# Patient Record
Sex: MALE | URBAN - METROPOLITAN AREA
[De-identification: names, ages, dates, MRNs, and addresses within clinical notes are randomized per-mention and may not be internally consistent; named-entity substitution may affect disease eponyms.]

---

## 2022-10-31 ENCOUNTER — APPOINTMENT (RX ONLY)
Dept: URBAN - METROPOLITAN AREA CLINIC 176 | Facility: CLINIC | Age: 47
Setting detail: DERMATOLOGY
End: 2022-10-31

## 2022-10-31 DIAGNOSIS — Z41.9 ENCOUNTER FOR PROCEDURE FOR PURPOSES OTHER THAN REMEDYING HEALTH STATE, UNSPECIFIED: ICD-10-CM

## 2022-10-31 PROCEDURE — ? HYDRAFACIAL

## 2022-10-31 ASSESSMENT — LOCATION SIMPLE DESCRIPTION DERM
LOCATION SIMPLE: RIGHT FOREHEAD
LOCATION SIMPLE: LEFT CHEEK
LOCATION SIMPLE: RIGHT CHEEK

## 2022-10-31 ASSESSMENT — LOCATION DETAILED DESCRIPTION DERM
LOCATION DETAILED: RIGHT INFERIOR CENTRAL MALAR CHEEK
LOCATION DETAILED: LEFT CENTRAL MALAR CHEEK
LOCATION DETAILED: RIGHT MEDIAL FOREHEAD

## 2022-10-31 ASSESSMENT — LOCATION ZONE DERM: LOCATION ZONE: FACE

## 2022-10-31 NOTE — PROCEDURE: HYDRAFACIAL
Solution Override
Vacuum Pressure High Setting (Will Not Render If Set To 0): 22
Tip: Hydropeel Tip, Teal
Number Of Passes: 0
Price (Use Numbers Only, No Special Characters Or $): 169.00
Indication: anti-aging
Consent: Written consent obtained, risks reviewed including but not limited to crusting, scabbing, blistering, scarring, darker or lighter pigmentary change, bruising, and/or incomplete response.
Tip Override
Procedure: Peel
Procedure: Exfoliation
Vacuum Pressure High Setting (Will Not Render If Set To 0): 16
Post-Care Instructions: I reviewed with the patient in detail post-care instructions. Patient should stay away from the sun and wear sun protection until treated areas are fully healed.
Location: face
Tip: Hydropeel Tip, Clear
Procedure: Extend and Protect
Procedure: Extraction
Solution: Activ-4
Procedure: Boost
Solution: GlySal 7.5%
Vacuum Pressure High Setting (Will Not Render If Set To 0): 14
Procedure: Fusion
Vacuum Pressure High Setting (Will Not Render If Set To 0): 14
Tip: Hydropeel Tip, Blue
Treatment Number: 1
Solution: Beta-HD